# Patient Record
Sex: MALE | Race: WHITE | NOT HISPANIC OR LATINO | Employment: FULL TIME | URBAN - METROPOLITAN AREA
[De-identification: names, ages, dates, MRNs, and addresses within clinical notes are randomized per-mention and may not be internally consistent; named-entity substitution may affect disease eponyms.]

---

## 2019-09-24 ENCOUNTER — OFFICE VISIT (OUTPATIENT)
Dept: ORTHOPEDIC SURGERY | Facility: CLINIC | Age: 39
End: 2019-09-24

## 2019-09-24 VITALS — BODY MASS INDEX: 29.35 KG/M2 | OXYGEN SATURATION: 98 % | WEIGHT: 205 LBS | HEIGHT: 70 IN | HEART RATE: 68 BPM

## 2019-09-24 DIAGNOSIS — M77.02 MEDIAL EPICONDYLITIS OF ELBOW, LEFT: Primary | ICD-10-CM

## 2019-09-24 DIAGNOSIS — M75.22 BICEPS TENDINITIS OF LEFT UPPER EXTREMITY: ICD-10-CM

## 2019-09-24 PROCEDURE — 99204 OFFICE O/P NEW MOD 45 MIN: CPT | Performed by: PHYSICIAN ASSISTANT

## 2019-09-24 RX ORDER — ESCITALOPRAM OXALATE 20 MG/1
TABLET ORAL
COMMUNITY
Start: 2019-08-31 | End: 2022-12-05

## 2019-09-24 RX ORDER — DICLOFENAC SODIUM 75 MG/1
TABLET, DELAYED RELEASE ORAL
COMMUNITY
Start: 2019-09-18 | End: 2022-12-05

## 2019-09-24 RX ORDER — DOXEPIN HYDROCHLORIDE 10 MG/1
CAPSULE ORAL
COMMUNITY
Start: 2019-08-01 | End: 2022-04-28

## 2019-09-24 NOTE — PROGRESS NOTES
[facility] [place] Orthopaedic Surgery Clinic Note    Subjective     Patient: Josué Yang  : 1980    Primary Care Provider: Akhil Caruso MD    Requesting Provider: As above    Pain of the Left Elbow (Last injection 8 months ago )      History    Chief Complaint: left elbow pain    History of Present Illness: This is a very pleasant LHD male with complaints of left medial elbow pain for one year.  He denies any trauma or injury.  He has pain with lifting and driving.  He has rest pain and night pain as well.  He rates ain 7/10 and burning and shooting.  He denies any numbness/tingling in the hand.  He has treated with diclofenac and had steroid injection 8 months ago that improved pain for several weeks.   He is here for further evaluation and treatment recommendations.    Current Outpatient Medications on File Prior to Visit   Medication Sig Dispense Refill   • diclofenac (VOLTAREN) 75 MG EC tablet      • doxepin (SINEquan) 10 MG capsule      • escitalopram (LEXAPRO) 20 MG tablet        No current facility-administered medications on file prior to visit.       No Known Allergies   History reviewed. No pertinent past medical history.  Past Surgical History:   Procedure Laterality Date   • HAND SURGERY Right     Fractured ring finger    • HERNIA REPAIR       History reviewed. No pertinent family history.   Social History     Socioeconomic History   • Marital status:      Spouse name: Not on file   • Number of children: Not on file   • Years of education: Not on file   • Highest education level: Not on file   Tobacco Use   • Smoking status: Never Smoker   • Smokeless tobacco: Never Used   Substance and Sexual Activity   • Alcohol use: Yes     Comment: Occ    • Drug use: No        Review of Systems   Constitutional: Negative.    HENT: Negative.    Eyes: Negative.    Respiratory: Negative.    Cardiovascular: Negative.    Gastrointestinal: Negative.    Endocrine: Negative.    Genitourinary: Negative.  "   Musculoskeletal: Positive for arthralgias.   Skin: Negative.    Allergic/Immunologic: Negative.    Neurological: Negative.    Hematological: Negative.    Psychiatric/Behavioral: Negative.        The following portions of the patient's history were reviewed and updated as appropriate: allergies, current medications, past family history, past medical history, past social history, past surgical history and problem list.      Objective      Physical Exam  Pulse 68   Ht 177.8 cm (70\")   Wt 93 kg (205 lb)   SpO2 98%   BMI 29.41 kg/m²     Body mass index is 29.41 kg/m².    GENERAL: Body habitus: normal weight for height    Gait: normal     Mental Status:  awake and alert; oriented to person, place, and time    Voice:  clear  SKIN:  Normal    Hair Growth:  Right:normal; Left:  normal  HEENT: Head: Normocephalic, atraumatic,  without obvious abnormality.  eye: normal external eye, no icterus   PULM:  Repiratory effort normal    Ortho Exam  Musculoskeletal   Upper Extremity   Peripheral Vascular   Left Upper Extremity    No cyanotic nail beds    Pink nail beds and rapid capillary refill   Palpation    Radial Pulse - Bilaterally normal   Left Shoulder     Inspection and Palpation:     Tenderness - tender over bicipital groove    Crepitus - none    Sensation is normal    Examination reveals no ecchymosis.      Strength and Tone:    Supraspinatus - 5/5    External Rotators-5/5    Infraspinatus - 5/5    Subscapularis - 5/5    Deltoid - 5/5     Range of Motion   Right shoulder:    Internal Rotation: ROM - T7    External Rotation: AROM - 80 degrees    Elevation through flexion: AROM - 180 degrees    Left Shoulder:    Internal Rotation: ROM - T7    External Rotation: AROM - 80 degrees    Elevation through flexion: AROM - 180 degrees        .Peripheral Vascular   Left Upper Extremity    No cyanotic nail beds    Pink nail beds and rapid capillary refill   Palpation    Radial Pulse - Bilaterally normal    Neurologic   Sensory - " Elbow   Inspection and Palpation:    Light touch: intact - right hand   Muscle Strength and Tone:    Left bicep - 5/5    Left tricep - 5/5    Left wrist extensors - 5/5     Left wrist flexors - 5/5  Pain with resisted wrist flexion    Left intrinsics - 5/5    Musculoskeletal   Left Upper Extremity - Elbow   Inspection and Palpation     Tenderness - over the medial epicondyle    Effusion - none    Crepitus - none   Range of Motion    Flexion: AROM - 140 degrees    Extension - AROM - 0 degrees     Forearm supination: AROM - 90 degrees    Forearm pronation - AROM - 90 degrees   Deformities/Malalignments/Discepancies - none   Functional testing:    Valgus stress test negative    Varus stress test negative    Elbow flexion test negative    Tinel's sign at Cubital tunnel negative        Medical Decision Making    Data Review:   ordered and reviewed x-rays today and reviewed radiology images    Assessment:  1. Medial epicondylitis of elbow, left    2. Biceps tendinitis of left upper extremity        Plan:  Left medial epicondylitis and biceps tendonitis.  I reviewed x-rays from 6/19 and clinical findings with the patient.  On exam, he is tender over the medial epicondyle with pain with resisted wrist flexion and at the biceps tendon both proximally and distally.  X-rays are negative for any acute or bony findings.  I think his pain and functional limitations are secondary to medial epicondylitis.  He had injection 8 months ago with relief of elbow pain for several weeks.  Recommendation today is a round of PT for both the elbow and the biceps.  I have given him a prescription for Pennsaid.  He will return in 6 weeks following PT to see how he has progressed or sooner if needed.  He will continue his diclofenac.    History, diagnosis and treatment plan discussed with Dr. Martinez.          Arabella Barriga PA-C  09/25/19  11:31 AM

## 2019-09-25 ENCOUNTER — TELEPHONE (OUTPATIENT)
Dept: ORTHOPEDIC SURGERY | Facility: CLINIC | Age: 39
End: 2019-09-25

## 2019-09-25 NOTE — TELEPHONE ENCOUNTER
PATIENT WAS SEEN 9/24/2019, PRESCRIPTION CREAM WAS SENT TO HIS PHARMACY, SAID HIS INSURANCE DIDN'T COVER IT AND IT WAS EXTREMELY EXPENSIVE, WANTED TO KNOW IF THERE WAS ANYTHING WE COULD SEND OVER THAT WOULD BE CHEAPER.

## 2019-09-26 NOTE — TELEPHONE ENCOUNTER
I called the patient, he explained that [employer] called him and told him this was not covered by his insurance and would cost him $2600. I told him I didn't think that was correct since he has commercial insurance. I called our Rep for Pennsaid (Ana) she said no it should be covered. She told me to cancel the other order and to reorder it and she would take care of it. I called the patient to let him know. Arabella he wanted to know if an MRI would be warranted. He states his insurance covers MRI's 100% and didn't know if that would be an option. Please advise thank you!  Grisel

## 2019-09-26 NOTE — TELEPHONE ENCOUNTER
I think treating with PT and nsaids before MRI is reasonable.  If he would like to have the MRI, I am happy to put in an order.  The MRI would evaluate all soft tissue, muscles, bone etc. To help clarify what is going on.

## 2019-09-27 NOTE — TELEPHONE ENCOUNTER
I called patient and explained this and he is ok with trying PT and NSAIDS first. He did get the Pennsaid for $10.  Grisel

## 2019-11-05 ENCOUNTER — OFFICE VISIT (OUTPATIENT)
Dept: ORTHOPEDIC SURGERY | Facility: CLINIC | Age: 39
End: 2019-11-05

## 2019-11-05 VITALS — WEIGHT: 213.63 LBS | HEART RATE: 90 BPM | HEIGHT: 70 IN | BODY MASS INDEX: 30.58 KG/M2 | OXYGEN SATURATION: 98 %

## 2019-11-05 DIAGNOSIS — M77.02 MEDIAL EPICONDYLITIS OF ELBOW, LEFT: Primary | ICD-10-CM

## 2019-11-05 PROCEDURE — 99212 OFFICE O/P EST SF 10 MIN: CPT | Performed by: PHYSICIAN ASSISTANT

## 2019-11-05 RX ORDER — CEPHALEXIN 500 MG/1
CAPSULE ORAL
COMMUNITY
Start: 2019-10-30 | End: 2020-01-02

## 2019-11-05 NOTE — PROGRESS NOTES
[facility] [place] [place] Orthopaedic Surgery Clinic Note    Subjective     Patient: Josué Yang  : 1980    Primary Care Provider: Akhil Caruso MD    Requesting Provider: As above    Follow-up (6 week f/u; Medial epicondylitis of elbow, left )      History    Chief Complaint: My left elbow    History of Present Illness: Patient returns today for follow-up of his left medial elbow pain.  He has done physical therapy and has been using a hinged elbow brace that he had at home without any improve pain.  He denies any new symptoms.  He denies any radiating pain. His pain is still all medial based pain.  That he rates to be 4/10.  He is done 6 weeks of PT twice a week.  Current Outpatient Medications on File Prior to Visit   Medication Sig Dispense Refill   • cephalexin (KEFLEX) 500 MG capsule      • diclofenac (VOLTAREN) 75 MG EC tablet      • Diclofenac Sodium (PENNSAID) 2 % solution Place 1 g on the skin as directed by provider 2 (Two) Times a Day. 112 g 0   • doxepin (SINEquan) 10 MG capsule      • escitalopram (LEXAPRO) 20 MG tablet        No current facility-administered medications on file prior to visit.       No Known Allergies   History reviewed. No pertinent past medical history.  Past Surgical History:   Procedure Laterality Date   • HAND SURGERY Right     Fractured ring finger    • HERNIA REPAIR       History reviewed. No pertinent family history.   Social History     Socioeconomic History   • Marital status:      Spouse name: Not on file   • Number of children: Not on file   • Years of education: Not on file   • Highest education level: Not on file   Tobacco Use   • Smoking status: Never Smoker   • Smokeless tobacco: Never Used   Substance and Sexual Activity   • Alcohol use: Yes     Comment: Occ    • Drug use: No        Review of Systems   Constitutional: Negative.    HENT: Negative.    Eyes: Negative.    Respiratory: Negative.    Cardiovascular: Negative.    Gastrointestinal: Negative.   "  Endocrine: Negative.    Genitourinary: Negative.    Musculoskeletal: Positive for arthralgias.   Skin: Negative.    Allergic/Immunologic: Negative.    Neurological: Negative.    Hematological: Negative.    Psychiatric/Behavioral: Negative.        The following portions of the patient's history were reviewed and updated as appropriate: allergies, current medications, past family history, past medical history, past social history, past surgical history and problem list.      Objective      Physical Exam  Pulse 90   Ht 177.8 cm (70\")   Wt 96.9 kg (213 lb 10 oz)   SpO2 98%   BMI 30.65 kg/m²      Body mass index is 30.65 kg/m².    Patient is well developed, well nourished and in no acute distress.  Alert and oriented x 3.    Ortho Exam  Peripheral Vascular   Left Upper Extremity    No cyanotic nail beds    Pink nail beds and rapid capillary refill   Palpation    Radial Pulse - Bilaterally normal    Neurologic   Sensory - Elbow   Inspection and Palpation:    Light touch: intact - right hand   Muscle Strength and Tone:    Left bicep - 5/5    Left tricep - 5/5    Left wrist extensors - 5/5     Left wrist flexors - 5/5    Left intrinsics - 5/5    Musculoskeletal   Left Upper Extremity - Elbow   Inspection and Palpation     Tenderness - tender over the medial epicondyle with pain with pain with resisted forearm flexion    Effusion - none    Crepitus - none   Range of Motion    Flexion: AROM - 140 degrees    Extension - AROM - 0 degrees     Forearm supination: AROM - 90 degrees    Forearm pronation - AROM - 90 degrees       Medical Decision Making    Data Review:   none    Assessment:  1. Medial epicondylitis of elbow, left        Plan:  Left medial epicondylitis.  On exam, he is  over the medial epicondyle with pain with resisted flexion of the wrist.  Patient emili also had prior injection at another practice and has had 6 weeks of PT with home exercises as well.  He has used topical anti-inflammatories.  He " is used bracing.  He reports no improvement of his pain with persisting pain.  He is stayed off of work as well in hopes of improving pain.  Recommendation today is MRI of the left elbow for further evaluation.  In the meantime, he will back off of physical therapy but continue with the brace and anti-inflammatories.      Arabella Barriga PA-C  11/05/19  1:12 PM

## 2019-11-11 ENCOUNTER — HOSPITAL ENCOUNTER (OUTPATIENT)
Dept: MRI IMAGING | Facility: HOSPITAL | Age: 39
Discharge: HOME | End: 2019-11-11
Admitting: PHYSICIAN ASSISTANT

## 2019-11-11 DIAGNOSIS — M77.02 MEDIAL EPICONDYLITIS OF ELBOW, LEFT: ICD-10-CM

## 2019-11-11 PROCEDURE — 73221 MRI JOINT UPR EXTREM W/O DYE: CPT

## 2019-11-19 ENCOUNTER — OFFICE VISIT (OUTPATIENT)
Dept: ORTHOPEDIC SURGERY | Facility: CLINIC | Age: 39
End: 2019-11-19

## 2019-11-19 VITALS — OXYGEN SATURATION: 98 % | HEIGHT: 70 IN | WEIGHT: 213.63 LBS | HEART RATE: 101 BPM | BODY MASS INDEX: 30.58 KG/M2

## 2019-11-19 DIAGNOSIS — M25.522 LEFT ELBOW PAIN: Primary | ICD-10-CM

## 2019-11-19 DIAGNOSIS — M77.02 MEDIAL EPICONDYLITIS OF ELBOW, LEFT: ICD-10-CM

## 2019-11-19 PROCEDURE — 20551 NJX 1 TENDON ORIGIN/INSJ: CPT | Performed by: PHYSICIAN ASSISTANT

## 2019-11-19 PROCEDURE — 99213 OFFICE O/P EST LOW 20 MIN: CPT | Performed by: PHYSICIAN ASSISTANT

## 2019-11-19 RX ADMIN — METHYLPREDNISOLONE ACETATE 20 MG: 40 INJECTION, SUSPENSION INTRA-ARTICULAR; INTRALESIONAL; INTRAMUSCULAR; SOFT TISSUE at 11:35

## 2019-11-19 NOTE — PROGRESS NOTES
Procedure   Medium Joint Arthrocentesis: L elbow  Date/Time: 11/19/2019 11:35 AM  Consent given by: patient  Site marked: site marked  Timeout: Immediately prior to procedure a time out was called to verify the correct patient, procedure, equipment, support staff and site/side marked as required   Supporting Documentation  Indications: pain   Procedure Details  Location: elbow - L elbow (left epicondyle injection)  Preparation: Patient was prepped and draped in the usual sterile fashion  Needle size: 22 G  Approach: anteromedial  Medications administered: 1 mL lidocaine (cardiac); 20 mg methylPREDNISolone acetate 40 MG/ML

## 2019-11-19 NOTE — PROGRESS NOTES
[facility] [place] [facility] [place] [facility] Note    Subjective     Patient: Josué Yang  : 1980    Primary Care Provider: Akhil Caruso MD    Requesting Provider: As above    Follow-up and Pain of the Left Elbow (Medial Epicondylitis of Elbow/Post MRI )      History    Chief Complaint: Follow-up left elbow MRI    History of Present Illness: Patient returns today for follow-up of his left MRI MRI.  He reports that the pain has persisted and not improved.  It is the same pain medial in nature.  He has aching and burning.  He has been off work.  He is using a brace as well as done physical therapy, anti-inflammatories with no improvement of his pain he did have injection about 8 months ago which improved his pain for several weeks.    Current Outpatient Medications on File Prior to Visit   Medication Sig Dispense Refill   • cephalexin (KEFLEX) 500 MG capsule      • diclofenac (VOLTAREN) 75 MG EC tablet      • Diclofenac Sodium (PENNSAID) 2 % solution Place 1 g on the skin as directed by provider 2 (Two) Times a Day. 112 g 0   • doxepin (SINEquan) 10 MG capsule      • escitalopram (LEXAPRO) 20 MG tablet        No current facility-administered medications on file prior to visit.       No Known Allergies   History reviewed. No pertinent past medical history.  Past Surgical History:   Procedure Laterality Date   • HAND SURGERY Right     Fractured ring finger    • HERNIA REPAIR       History reviewed. No pertinent family history.   Social History     Socioeconomic History   • Marital status:      Spouse name: Not on file   • Number of children: Not on file   • Years of education: Not on file   • Highest education level: Not on file   Tobacco Use   • Smoking status: Never Smoker   • Smokeless tobacco: Never Used   Substance and Sexual Activity   • Alcohol use: Yes     Comment: Occ    • Drug use: No        Review of Systems   Constitutional: Negative.    HENT: Negative.    Eyes: Negative.   "  Respiratory: Negative.    Cardiovascular: Negative.    Gastrointestinal: Negative.    Endocrine: Negative.    Genitourinary: Negative.    Musculoskeletal: Positive for arthralgias.   Skin: Negative.    Allergic/Immunologic: Negative.    Neurological: Negative.    Hematological: Negative.    Psychiatric/Behavioral: Negative.        The following portions of the patient's history were reviewed and updated as appropriate: allergies, current medications, past family history, past medical history, past social history, past surgical history and problem list.      Objective      Physical Exam  Pulse 101   Ht 177.8 cm (70\")   Wt 96.9 kg (213 lb 10 oz)   SpO2 98%   BMI 30.65 kg/m²     Body mass index is 30.65 kg/m².    Patient is well developed, well nourished and in no acute distress.  Alert and oriented x 3.    Ortho Exam  Left elbow exam:  Patient is tender over the medial epicondyle with pain with resisted wrist flexion and forearm pronation.  He has normal motion and strength of the elbow.  Medical Decision Making    Data Review:   reviewed radiology images    Assessment:  1. Left elbow pain    2. Medial epicondylitis of elbow, left        Plan:  Left medial epicondylitis.  I reviewed MRI from 11/15/2019 and clinical findings with the patient.  MRI shows mild changes of medial epicondylitis with questionable mild lateral epicondylitis as well.  No evidence of internal derangement, acute or healing trauma elsewhere.  Patient has exhausted conservative treatment including physical therapy, anti-inflammatories, activity modification, bracing.  I reassured the patient that there is nothing worrisome on exam.  He had an injection about 8 months ago with gave him several weeks relief.  Plan today is repeat steroid injection in to the medial epicondyle at the origin.  He will return to see me in 6 to 8 weeks or sooner if needed.  Unfortunately, I do not know if there is anything else we have to offer him.    History, " diagnosis and treatment plan discussed with Dr. Martinez.    See procedure note for details.      Arabella Barriga PA-C  11/20/19  2:25 PM

## 2019-11-20 RX ORDER — METHYLPREDNISOLONE ACETATE 40 MG/ML
20 INJECTION, SUSPENSION INTRA-ARTICULAR; INTRALESIONAL; INTRAMUSCULAR; SOFT TISSUE
Status: COMPLETED | OUTPATIENT
Start: 2019-11-19 | End: 2019-11-19

## 2019-11-26 ENCOUNTER — TELEPHONE (OUTPATIENT)
Dept: ORTHOPEDIC SURGERY | Facility: CLINIC | Age: 39
End: 2019-11-26

## 2020-01-02 ENCOUNTER — OFFICE VISIT (OUTPATIENT)
Dept: ORTHOPEDIC SURGERY | Facility: CLINIC | Age: 40
End: 2020-01-02

## 2020-01-02 VITALS — OXYGEN SATURATION: 98 % | WEIGHT: 214 LBS | HEART RATE: 76 BPM | HEIGHT: 70 IN | BODY MASS INDEX: 30.64 KG/M2

## 2020-01-02 DIAGNOSIS — M77.02 MEDIAL EPICONDYLITIS OF ELBOW, LEFT: Primary | ICD-10-CM

## 2020-01-02 PROCEDURE — 99212 OFFICE O/P EST SF 10 MIN: CPT | Performed by: PHYSICIAN ASSISTANT

## 2020-01-02 NOTE — PROGRESS NOTES
[facility] [place] Orthopaedic Surgery Clinic Note    Subjective     Patient: Josué Yang  : 1980    Primary Care Provider: Akhil Caruso MD    Requesting Provider: As above    Follow-up (6 week recheck - Medial epicondylitis of elbow, left )      History    Chief Complaint: Follow-up medial epicondylitis    History of Present Illness: Patient returns today for follow-up of his left medial epicondylitis.  He reports no change in pain.  He did have steroid injection 6 weeks ago.  This was his second injection.  He has done physical therapy, bracing, activity modification with persisting pain.  He has had MRI as well.  He is here for further evaluation and treatment recommendations.  He has returned to work at [employer].    Current Outpatient Medications on File Prior to Visit   Medication Sig Dispense Refill   • Diclofenac Sodium (PENNSAID) 2 % solution Place 1 g on the skin as directed by provider 2 (Two) Times a Day. 112 g 0   • doxepin (SINEquan) 10 MG capsule      • escitalopram (LEXAPRO) 20 MG tablet      • diclofenac (VOLTAREN) 75 MG EC tablet        No current facility-administered medications on file prior to visit.       No Known Allergies   History reviewed. No pertinent past medical history.  Past Surgical History:   Procedure Laterality Date   • HAND SURGERY Right     Fractured ring finger    • HERNIA REPAIR       History reviewed. No pertinent family history.   Social History     Socioeconomic History   • Marital status:      Spouse name: Not on file   • Number of children: Not on file   • Years of education: Not on file   • Highest education level: Not on file   Tobacco Use   • Smoking status: Never Smoker   • Smokeless tobacco: Never Used   Substance and Sexual Activity   • Alcohol use: Yes     Comment: Occ    • Drug use: No        Review of Systems   Constitutional: Negative.    HENT: Negative.    Eyes: Negative.    Respiratory: Negative.    Cardiovascular: Negative.   "  Gastrointestinal: Negative.    Endocrine: Negative.    Genitourinary: Negative.    Musculoskeletal: Positive for arthralgias.   Skin: Negative.    Allergic/Immunologic: Negative.    Neurological: Negative.    Hematological: Negative.    Psychiatric/Behavioral: Negative.        The following portions of the patient's history were reviewed and updated as appropriate: allergies, current medications, past family history, past medical history, past social history, past surgical history and problem list.      Objective      Physical Exam  Pulse 76   Ht 177.8 cm (70\")   Wt 97.1 kg (214 lb)   SpO2 98%   BMI 30.71 kg/m²     Body mass index is 30.71 kg/m².    Patient is well developed, well nourished and in no acute distress.  Alert and oriented x 3.    Ortho Exam  Peripheral Vascular   Left Upper Extremity    No cyanotic nail beds    Pink nail beds and rapid capillary refill   Palpation    Radial Pulse - Bilaterally normal    Neurologic   Sensory - Elbow   Inspection and Palpation:    Light touch: intact - right hand   Muscle Strength and Tone:    Left bicep - 5/5    Left tricep - 5/5    Left wrist extensors - 5/5     Left wrist flexors - 5/5    Left intrinsics - 5/5    Musculoskeletal   Left Upper Extremity - Elbow   Inspection and Palpation     Tenderness - at the medial epicondyle    Effusion - none    Crepitus - none   Range of Motion    Flexion: AROM - 140 degrees    Extension - AROM - 0 degrees     Forearm supination: AROM - 90 degrees    Forearm pronation - AROM - 90 degrees       Medical Decision Making    Data Review:   none    Assessment:  1. Medial epicondylitis of elbow, left        Plan:  Left medial epicondylitis persisting despite conservative treatment with physical therapy, topical anti-inflammatories, oral anti-inflammatories, bracing, steroid injection.  MRI did show medial epicondylitis with nothing more worrisome.  We discussed further treatment options including PRP injection versus referral to an " elbow specialist.  I explained that the PRP injection is out-of-pocket.  His been shown to improve epicondylitis more than it has with other diagnoses.  Patient would like to try referral to elbow surgeon to see if they have any further treatment options.  I will put in referral to Dr. Song.  He will return to see us as needed.    History, diagnosis and treatment plan discussed with Dr. Martinez.          Arabella Barriga PA-C  01/03/20  9:11 AM

## 2022-04-28 RX ORDER — DOXEPIN HYDROCHLORIDE 10 MG/1
CAPSULE ORAL
Qty: 60 CAPSULE | Refills: 0 | Status: SHIPPED | OUTPATIENT
Start: 2022-04-28 | End: 2022-06-03

## 2022-06-03 RX ORDER — DOXEPIN HYDROCHLORIDE 10 MG/1
CAPSULE ORAL
Qty: 10 CAPSULE | Refills: 0 | Status: SHIPPED | OUTPATIENT
Start: 2022-06-03 | End: 2022-08-01 | Stop reason: SDUPTHER

## 2022-06-03 NOTE — TELEPHONE ENCOUNTER
Gave enough for 5 days so patient can call the office and get an appointment. He was last seen in the office on 01/24/22 for covid. Other than that visit, patient has not been seen for medications since 10/2020. Tried calling patient, but it would not ring out. Unsure what is going on

## 2022-07-29 RX ORDER — DOXEPIN HYDROCHLORIDE 10 MG/1
CAPSULE ORAL
Qty: 10 CAPSULE | Refills: 0 | OUTPATIENT
Start: 2022-07-29

## 2022-08-01 RX ORDER — DOXEPIN HYDROCHLORIDE 10 MG/1
10-20 CAPSULE ORAL
Qty: 60 CAPSULE | Refills: 0 | Status: SHIPPED | OUTPATIENT
Start: 2022-08-01 | End: 2022-09-15

## 2022-09-15 RX ORDER — DOXEPIN HYDROCHLORIDE 10 MG/1
CAPSULE ORAL
Qty: 60 CAPSULE | Refills: 0 | Status: SHIPPED | OUTPATIENT
Start: 2022-09-15 | End: 2022-10-17

## 2022-09-15 NOTE — TELEPHONE ENCOUNTER
Patient has been told to get an appointment. He had one scheduled for tomorrow and cancelled and rescheduled now for end of December. He has not been seen with us since 2020.

## 2022-10-17 RX ORDER — DOXEPIN HYDROCHLORIDE 10 MG/1
CAPSULE ORAL
Qty: 60 CAPSULE | Refills: 0 | Status: SHIPPED | OUTPATIENT
Start: 2022-10-17 | End: 2022-11-16

## 2022-11-16 ENCOUNTER — TELEPHONE (OUTPATIENT)
Dept: FAMILY MEDICINE CLINIC | Facility: CLINIC | Age: 42
End: 2022-11-16

## 2022-11-16 RX ORDER — DOXEPIN HYDROCHLORIDE 10 MG/1
CAPSULE ORAL
Qty: 60 CAPSULE | Refills: 0 | Status: SHIPPED | OUTPATIENT
Start: 2022-11-16 | End: 2022-12-05 | Stop reason: SDUPTHER

## 2022-11-16 NOTE — TELEPHONE ENCOUNTER
Caller: Josué Yang    Relationship: Self    Best call back number:[phone]    What orders are you requesting (i.e. lab or imaging): LABS    In what timeframe would the patient need to come in: ANY Monday BEFORE THAT OR ANY DAY THIS WEEK    Where will you receive your lab/imaging services: IN OFFICE    Additional notes: PLEASE CALL TO SCHEDULE. THIS IS FOR HIS APPOINTMENT ON 12/5

## 2022-11-17 ENCOUNTER — LAB (OUTPATIENT)
Dept: FAMILY MEDICINE CLINIC | Facility: CLINIC | Age: 42
End: 2022-11-17

## 2022-11-17 DIAGNOSIS — Z00.00 ROUTINE GENERAL MEDICAL EXAMINATION AT A HEALTH CARE FACILITY: Primary | ICD-10-CM

## 2022-11-17 PROCEDURE — 36415 COLL VENOUS BLD VENIPUNCTURE: CPT | Performed by: FAMILY MEDICINE

## 2022-11-18 LAB
ALBUMIN SERPL-MCNC: 4.9 G/DL (ref 4–5)
ALBUMIN/GLOB SERPL: 1.7 {RATIO} (ref 1.2–2.2)
ALP SERPL-CCNC: 71 IU/L (ref 44–121)
ALT SERPL-CCNC: 26 IU/L (ref 0–44)
AST SERPL-CCNC: 22 IU/L (ref 0–40)
BASOPHILS # BLD AUTO: 0.1 X10E3/UL (ref 0–0.2)
BASOPHILS NFR BLD AUTO: 1 %
BILIRUB SERPL-MCNC: 0.2 MG/DL (ref 0–1.2)
BUN SERPL-MCNC: 15 MG/DL (ref 6–24)
BUN/CREAT SERPL: 22 (ref 9–20)
CALCIUM SERPL-MCNC: 9.7 MG/DL (ref 8.7–10.2)
CHLORIDE SERPL-SCNC: 99 MMOL/L (ref 96–106)
CHOLEST SERPL-MCNC: 255 MG/DL (ref 100–199)
CO2 SERPL-SCNC: 23 MMOL/L (ref 20–29)
CREAT SERPL-MCNC: 0.67 MG/DL (ref 0.76–1.27)
EGFRCR SERPLBLD CKD-EPI 2021: 119 ML/MIN/1.73
EOSINOPHIL # BLD AUTO: 0.2 X10E3/UL (ref 0–0.4)
EOSINOPHIL NFR BLD AUTO: 3 %
ERYTHROCYTE [DISTWIDTH] IN BLOOD BY AUTOMATED COUNT: 13 % (ref 11.6–15.4)
GLOBULIN SER CALC-MCNC: 2.9 G/DL (ref 1.5–4.5)
GLUCOSE SERPL-MCNC: 86 MG/DL (ref 70–99)
HCT VFR BLD AUTO: 48 % (ref 37.5–51)
HDLC SERPL-MCNC: 49 MG/DL
HGB BLD-MCNC: 16.5 G/DL (ref 13–17.7)
IMM GRANULOCYTES # BLD AUTO: 0 X10E3/UL (ref 0–0.1)
IMM GRANULOCYTES NFR BLD AUTO: 0 %
LDLC SERPL CALC-MCNC: 101 MG/DL (ref 0–99)
LYMPHOCYTES # BLD AUTO: 2.7 X10E3/UL (ref 0.7–3.1)
LYMPHOCYTES NFR BLD AUTO: 34 %
MCH RBC QN AUTO: 29.9 PG (ref 26.6–33)
MCHC RBC AUTO-ENTMCNC: 34.4 G/DL (ref 31.5–35.7)
MCV RBC AUTO: 87 FL (ref 79–97)
MONOCYTES # BLD AUTO: 0.5 X10E3/UL (ref 0.1–0.9)
MONOCYTES NFR BLD AUTO: 7 %
NEUTROPHILS # BLD AUTO: 4.4 X10E3/UL (ref 1.4–7)
NEUTROPHILS NFR BLD AUTO: 55 %
PLATELET # BLD AUTO: 254 X10E3/UL (ref 150–450)
POTASSIUM SERPL-SCNC: 4.7 MMOL/L (ref 3.5–5.2)
PROT SERPL-MCNC: 7.8 G/DL (ref 6–8.5)
RBC # BLD AUTO: 5.52 X10E6/UL (ref 4.14–5.8)
SODIUM SERPL-SCNC: 139 MMOL/L (ref 134–144)
TRIGL SERPL-MCNC: 621 MG/DL (ref 0–149)
TSH SERPL DL<=0.005 MIU/L-ACNC: 2.03 UIU/ML (ref 0.45–4.5)
VLDLC SERPL CALC-MCNC: 105 MG/DL (ref 5–40)
WBC # BLD AUTO: 8 X10E3/UL (ref 3.4–10.8)

## 2022-12-05 ENCOUNTER — OFFICE VISIT (OUTPATIENT)
Dept: FAMILY MEDICINE CLINIC | Facility: CLINIC | Age: 42
End: 2022-12-05

## 2022-12-05 VITALS
SYSTOLIC BLOOD PRESSURE: 140 MMHG | BODY MASS INDEX: 28.77 KG/M2 | WEIGHT: 201 LBS | DIASTOLIC BLOOD PRESSURE: 84 MMHG | OXYGEN SATURATION: 98 % | HEART RATE: 64 BPM | HEIGHT: 70 IN

## 2022-12-05 DIAGNOSIS — E78.5 HYPERLIPIDEMIA, UNSPECIFIED HYPERLIPIDEMIA TYPE: ICD-10-CM

## 2022-12-05 DIAGNOSIS — F51.01 PRIMARY INSOMNIA: ICD-10-CM

## 2022-12-05 DIAGNOSIS — Z00.00 ROUTINE GENERAL MEDICAL EXAMINATION AT A HEALTH CARE FACILITY: Primary | ICD-10-CM

## 2022-12-05 PROCEDURE — 99396 PREV VISIT EST AGE 40-64: CPT | Performed by: FAMILY MEDICINE

## 2022-12-05 PROCEDURE — 36415 COLL VENOUS BLD VENIPUNCTURE: CPT | Performed by: FAMILY MEDICINE

## 2022-12-05 RX ORDER — DOXEPIN HYDROCHLORIDE 10 MG/1
CAPSULE ORAL
Qty: 180 CAPSULE | Refills: 1 | Status: SHIPPED | OUTPATIENT
Start: 2022-12-05

## 2022-12-05 NOTE — PROGRESS NOTES
Male Physical Note      Date:  2022   Patient Name: Josué Yang  : 1980   MRN: [id number]     Chief Complaint:    Chief Complaint   Patient presents with   • Annual Exam       History of Present Illness: Josué Yang is a 42 y.o. male who is here today for their annual health maintenance and physical.  Overall patient doing well.  Does take doxepin for insomnia.  Blood work was normal all but his lipid panel.  He was not fasting.      Subjective      Review of Systems:   Review of Systems   Constitutional: Negative for fatigue and fever.   HENT: Negative for congestion and ear pain.    Respiratory: Negative for apnea, cough, chest tightness and shortness of breath.    Cardiovascular: Negative for chest pain.   Gastrointestinal: Negative for abdominal pain, constipation, diarrhea and nausea.   Musculoskeletal: Negative for arthralgias.   Psychiatric/Behavioral: Negative for depressed mood and stress.       Past Medical History:   Past Medical History:   Diagnosis Date   • Enthesopathy    • Insomnia        Past Surgical History:   Past Surgical History:   Procedure Laterality Date   • HAND SURGERY Right     Fractured ring finger    • HERNIA REPAIR         Family History:   Family History   Problem Relation Age of Onset   • Hyperlipidemia Father    • Asthma Father    • Hypertension Father        Social History:   Social History     Socioeconomic History   • Marital status:    Tobacco Use   • Smoking status: Never   • Smokeless tobacco: Never   Substance and Sexual Activity   • Alcohol use: Yes     Comment: Occ    • Drug use: No       Medications:     Current Outpatient Medications:   •  doxepin (SINEquan) 10 MG capsule, TAKE ONE TO TWO CAPSULES BY MOUTH EVERY NIGHT AT BEDTIME, Disp: 180 capsule, Rfl: 1    Allergies:   No Known Allergies    Immunization History   Administered Date(s) Administered   • COVID-19 (PFIZER) PURPLE CAP 2021, 2021   • Hepatitis A 2019,  "09/18/2019   • Influenza, Unspecified 10/13/2020   • Tdap 10/13/2020     Colorectal Screening:     Last Completed Colonoscopy     This patient has no relevant Health Maintenance data.           Diet/Physical activity: Appropriate diet and physical activity discussed.    Depression: PHQ-2 Depression Screening  Little interest or pleasure in doing things? 0-->not at all   Feeling down, depressed, or hopeless? 0-->not at all   PHQ-2 Total Score 0        Objective     Physical Exam:  Vital Signs:   Vitals:    12/05/22 0844   BP: 140/84   Pulse: 64   SpO2: 98%   Weight: 91.2 kg (201 lb)   Height: 177.8 cm (70\")     Body mass index is 28.84 kg/m².     Physical Exam  Vitals and nursing note reviewed.   Constitutional:       General: He is not in acute distress.     Appearance: Normal appearance. He is not ill-appearing.   HENT:      Head: Normocephalic and atraumatic.      Right Ear: Tympanic membrane and ear canal normal.      Left Ear: Tympanic membrane and ear canal normal.      Nose: Nose normal.   Cardiovascular:      Rate and Rhythm: Normal rate and regular rhythm.      Heart sounds: Normal heart sounds.   Pulmonary:      Effort: Pulmonary effort is normal.      Breath sounds: Normal breath sounds.   Neurological:      Mental Status: He is alert and oriented to person, place, and time. Mental status is at baseline.   Psychiatric:         Mood and Affect: Mood normal.         Procedures    Assessment / Plan      Assessment/Plan:   Diagnoses and all orders for this visit:    1. Routine general medical examination at a health care facility (Primary)    2. Hyperlipidemia, unspecified hyperlipidemia type  -     Lipid panel; Future    3. Primary insomnia    Other orders  -     doxepin (SINEquan) 10 MG capsule; TAKE ONE TO TWO CAPSULES BY MOUTH EVERY NIGHT AT BEDTIME  Dispense: 180 capsule; Refill: 1         We will recheck lipid panel since his numbers were elevated.  He was not fasting.  Annual health maintenance " discussed.  Refill doxepin for insomnia.      Follow Up:   No follow-ups on file.    Healthcare Maintenance:   Counseling provided on appropriate health maintenance discussed.  Josué Yang voices understanding and acceptance of this advice and will call back with any further questions or concerns. AVS with preventive healthcare tips printed for patient.     Akhil Caruso MD  [facility] [place] [facility] [employer]

## 2022-12-06 LAB
CHOLEST SERPL-MCNC: 270 MG/DL (ref 100–199)
HDLC SERPL-MCNC: 56 MG/DL
LDLC SERPL CALC-MCNC: 186 MG/DL (ref 0–99)
TRIGL SERPL-MCNC: 153 MG/DL (ref 0–149)
VLDLC SERPL CALC-MCNC: 28 MG/DL (ref 5–40)

## 2023-01-30 ENCOUNTER — OFFICE VISIT (OUTPATIENT)
Dept: FAMILY MEDICINE CLINIC | Facility: CLINIC | Age: 43
End: 2023-01-30
Payer: COMMERCIAL

## 2023-01-30 VITALS
DIASTOLIC BLOOD PRESSURE: 88 MMHG | OXYGEN SATURATION: 98 % | SYSTOLIC BLOOD PRESSURE: 120 MMHG | HEIGHT: 70 IN | BODY MASS INDEX: 28.49 KG/M2 | HEART RATE: 52 BPM | WEIGHT: 199 LBS

## 2023-01-30 DIAGNOSIS — E78.2 MIXED HYPERLIPIDEMIA: Primary | ICD-10-CM

## 2023-01-30 PROCEDURE — 99213 OFFICE O/P EST LOW 20 MIN: CPT | Performed by: FAMILY MEDICINE

## 2023-01-30 RX ORDER — ATORVASTATIN CALCIUM 20 MG/1
20 TABLET, FILM COATED ORAL DAILY
Qty: 90 TABLET | Refills: 1 | Status: SHIPPED | OUTPATIENT
Start: 2023-01-30

## 2023-01-30 NOTE — PROGRESS NOTES
Follow Up Office Visit      Date of Visit:  2023   Patient Name: Josué Yang  : 1980   MRN: [id number]     Chief Complaint:    Chief Complaint   Patient presents with   • go over labs       History of Present Illness: Josué Yang is a 42 y.o. male who is here today for follow up.  Patient here to review recent labs.  His recent lipid panel showed a very elevated triglycerides.  He was not fasting.  Recheck showed this to be more appropriate but his LDL was over 180.  Not currently on medication.        Subjective      Review of Systems:   Review of Systems   Constitutional: Negative for fatigue and fever.   HENT: Negative for congestion and ear pain.    Respiratory: Negative for apnea, cough, chest tightness and shortness of breath.    Cardiovascular: Negative for chest pain.   Gastrointestinal: Negative for abdominal pain, constipation, diarrhea and nausea.   Musculoskeletal: Negative for arthralgias.   Psychiatric/Behavioral: Negative for depressed mood and stress.       Past Medical History:   Past Medical History:   Diagnosis Date   • Enthesopathy    • Insomnia        Past Surgical History:   Past Surgical History:   Procedure Laterality Date   • HAND SURGERY Right     Fractured ring finger    • HERNIA REPAIR         Family History:   Family History   Problem Relation Age of Onset   • Hyperlipidemia Father    • Asthma Father    • Hypertension Father        Social History:   Social History     Socioeconomic History   • Marital status:    Tobacco Use   • Smoking status: Never   • Smokeless tobacco: Never   Substance and Sexual Activity   • Alcohol use: Yes     Comment: Occ    • Drug use: No       Medications:     Current Outpatient Medications:   •  doxepin (SINEquan) 10 MG capsule, TAKE ONE TO TWO CAPSULES BY MOUTH EVERY NIGHT AT BEDTIME, Disp: 180 capsule, Rfl: 1  •  atorvastatin (Lipitor) 20 MG tablet, Take 1 tablet by mouth Daily., Disp: 90 tablet, Rfl: 1    Allergies:  "  No Known Allergies    Objective     Physical Exam:  Vital Signs:   Vitals:    01/30/23 0958   BP: 120/88   Pulse: 52   SpO2: 98%   Weight: 90.3 kg (199 lb)   Height: 177.8 cm (70\")     Body mass index is 28.55 kg/m².     Physical Exam  Vitals and nursing note reviewed.   Constitutional:       General: He is not in acute distress.     Appearance: Normal appearance. He is not ill-appearing.   HENT:      Head: Normocephalic and atraumatic.      Right Ear: Tympanic membrane and ear canal normal.      Left Ear: Tympanic membrane and ear canal normal.      Nose: Nose normal.   Cardiovascular:      Rate and Rhythm: Normal rate and regular rhythm.      Heart sounds: Normal heart sounds.   Pulmonary:      Effort: Pulmonary effort is normal.      Breath sounds: Normal breath sounds.   Neurological:      Mental Status: He is alert and oriented to person, place, and time. Mental status is at baseline.   Psychiatric:         Mood and Affect: Mood normal.         Procedures      Assessment / Plan      Assessment/Plan:   Diagnoses and all orders for this visit:    1. Mixed hyperlipidemia (Primary)  -     Comprehensive Metabolic Panel; Future  -     Lipid Panel; Future    Other orders  -     atorvastatin (Lipitor) 20 MG tablet; Take 1 tablet by mouth Daily.  Dispense: 90 tablet; Refill: 1         Placed order for Lipitor.  Recheck labs in 3 months.    Follow Up:   No follow-ups on file.    Akhil Caruso  [facility] [place]   "

## 2023-04-24 ENCOUNTER — LAB (OUTPATIENT)
Dept: FAMILY MEDICINE CLINIC | Facility: CLINIC | Age: 43
End: 2023-04-24
Payer: COMMERCIAL

## 2023-04-24 DIAGNOSIS — E78.2 MIXED HYPERLIPIDEMIA: ICD-10-CM

## 2023-04-24 PROCEDURE — 36415 COLL VENOUS BLD VENIPUNCTURE: CPT | Performed by: FAMILY MEDICINE

## 2023-04-25 LAB
ALBUMIN SERPL-MCNC: 4.6 G/DL (ref 4–5)
ALBUMIN/GLOB SERPL: 2 {RATIO} (ref 1.2–2.2)
ALP SERPL-CCNC: 55 IU/L (ref 44–121)
ALT SERPL-CCNC: 22 IU/L (ref 0–44)
AST SERPL-CCNC: 20 IU/L (ref 0–40)
BILIRUB SERPL-MCNC: 0.9 MG/DL (ref 0–1.2)
BUN SERPL-MCNC: 14 MG/DL (ref 6–24)
BUN/CREAT SERPL: 13 (ref 9–20)
CALCIUM SERPL-MCNC: 9.2 MG/DL (ref 8.7–10.2)
CHLORIDE SERPL-SCNC: 105 MMOL/L (ref 96–106)
CHOLEST SERPL-MCNC: 132 MG/DL (ref 100–199)
CO2 SERPL-SCNC: 25 MMOL/L (ref 20–29)
CREAT SERPL-MCNC: 1.06 MG/DL (ref 0.76–1.27)
EGFRCR SERPLBLD CKD-EPI 2021: 89 ML/MIN/1.73
GLOBULIN SER CALC-MCNC: 2.3 G/DL (ref 1.5–4.5)
GLUCOSE SERPL-MCNC: 95 MG/DL (ref 70–99)
HDLC SERPL-MCNC: 48 MG/DL
LDLC SERPL CALC-MCNC: 70 MG/DL (ref 0–99)
POTASSIUM SERPL-SCNC: 4.6 MMOL/L (ref 3.5–5.2)
PROT SERPL-MCNC: 6.9 G/DL (ref 6–8.5)
SODIUM SERPL-SCNC: 142 MMOL/L (ref 134–144)
TRIGL SERPL-MCNC: 69 MG/DL (ref 0–149)
VLDLC SERPL CALC-MCNC: 14 MG/DL (ref 5–40)

## 2023-09-19 RX ORDER — ATORVASTATIN CALCIUM 20 MG/1
TABLET, FILM COATED ORAL
Qty: 30 TABLET | Refills: 0 | Status: SHIPPED | OUTPATIENT
Start: 2023-09-19

## 2023-09-20 ENCOUNTER — PATIENT MESSAGE (OUTPATIENT)
Dept: FAMILY MEDICINE CLINIC | Facility: CLINIC | Age: 43
End: 2023-09-20
Payer: COMMERCIAL

## 2023-09-20 DIAGNOSIS — Z00.00 ROUTINE GENERAL MEDICAL EXAMINATION AT A HEALTH CARE FACILITY: Primary | ICD-10-CM

## 2023-09-22 NOTE — TELEPHONE ENCOUNTER
Patient is coming in for a physical in December. He is also scheduled for a lab appointment. Please put in lab orders.

## 2023-09-25 RX ORDER — DOXEPIN HYDROCHLORIDE 10 MG/1
CAPSULE ORAL
Qty: 180 CAPSULE | Refills: 1 | Status: SHIPPED | OUTPATIENT
Start: 2023-09-25

## 2023-10-17 RX ORDER — ATORVASTATIN CALCIUM 20 MG/1
20 TABLET, FILM COATED ORAL DAILY
Qty: 30 TABLET | Refills: 2 | Status: SHIPPED | OUTPATIENT
Start: 2023-10-17

## 2023-12-11 ENCOUNTER — LAB (OUTPATIENT)
Dept: FAMILY MEDICINE CLINIC | Facility: CLINIC | Age: 43
End: 2023-12-11
Payer: COMMERCIAL

## 2023-12-18 ENCOUNTER — OFFICE VISIT (OUTPATIENT)
Dept: FAMILY MEDICINE CLINIC | Facility: CLINIC | Age: 43
End: 2023-12-18
Payer: COMMERCIAL

## 2023-12-18 VITALS
WEIGHT: 194 LBS | HEART RATE: 57 BPM | BODY MASS INDEX: 27.77 KG/M2 | HEIGHT: 70 IN | DIASTOLIC BLOOD PRESSURE: 80 MMHG | OXYGEN SATURATION: 98 % | SYSTOLIC BLOOD PRESSURE: 118 MMHG

## 2023-12-18 DIAGNOSIS — E78.2 MIXED HYPERLIPIDEMIA: ICD-10-CM

## 2023-12-18 DIAGNOSIS — F41.9 ANXIETY: ICD-10-CM

## 2023-12-18 DIAGNOSIS — Z00.00 ROUTINE GENERAL MEDICAL EXAMINATION AT A HEALTH CARE FACILITY: Primary | ICD-10-CM

## 2023-12-18 RX ORDER — DOXEPIN HYDROCHLORIDE 10 MG/1
CAPSULE ORAL
Qty: 180 CAPSULE | Refills: 1 | Status: SHIPPED | OUTPATIENT
Start: 2023-12-18

## 2023-12-18 RX ORDER — ATORVASTATIN CALCIUM 20 MG/1
20 TABLET, FILM COATED ORAL DAILY
Qty: 30 TABLET | Refills: 11 | Status: SHIPPED | OUTPATIENT
Start: 2023-12-18

## 2023-12-18 NOTE — PROGRESS NOTES
Male Physical Note      Date:  2023   Patient Name: Josué Yang  : 1980   MRN: [id number]     Chief Complaint:    Chief Complaint   Patient presents with    Annual Exam       History of Present Illness: Josué Yang is a 43 y.o. male who is here today for their annual health maintenance and physical.  Appropriate diet and exercise discussed.  Appropriate health maintenance discussed.  Discussed future colon cancer screening.  Flu shot given today.  Declines COVID-vaccine.  Needs refills on lipid medication.  Due for blood work.  Also taking medications for some anxiety and insomnia.  Condition overall stable.      Subjective      Review of Systems:   Review of Systems   Constitutional:  Negative for fatigue and fever.   HENT:  Negative for congestion and ear pain.    Respiratory:  Negative for apnea, cough, chest tightness and shortness of breath.    Cardiovascular:  Negative for chest pain.   Gastrointestinal:  Negative for abdominal pain, constipation, diarrhea and nausea.   Musculoskeletal:  Negative for arthralgias.   Psychiatric/Behavioral:  Negative for depressed mood and stress.        Past Medical History:   Past Medical History:   Diagnosis Date    Enthesopathy     Hyperlipidemia     Insomnia        Past Surgical History:   Past Surgical History:   Procedure Laterality Date    HAND SURGERY Right     Fractured ring finger     HERNIA REPAIR         Family History:   Family History   Problem Relation Age of Onset    Hyperlipidemia Father     Asthma Father     Hypertension Father        Social History:   Social History     Socioeconomic History    Marital status:    Tobacco Use    Smoking status: Never    Smokeless tobacco: Never   Vaping Use    Vaping Use: Never used   Substance and Sexual Activity    Alcohol use: Yes     Alcohol/week: 4.0 standard drinks of alcohol     Types: 2 Cans of beer, 2 Shots of liquor per week     Comment: Occ     Drug use: No    Sexual  "activity: Yes     Partners: Female     Birth control/protection: Hysterectomy       Medications:     Current Outpatient Medications:     atorvastatin (LIPITOR) 20 MG tablet, Take 1 tablet by mouth Daily., Disp: 30 tablet, Rfl: 11    doxepin (SINEquan) 10 MG capsule, TAKE ONE TO TWO CAPSULES BY MOUTH EVERY NIGHT AT BEDTIME, Disp: 180 capsule, Rfl: 1    Allergies:   No Known Allergies    Immunization History   Administered Date(s) Administered    COVID-19 (PFIZER) Purple Cap Monovalent 08/25/2021, 09/18/2021    Hepatitis A 03/04/2019, 09/18/2019    Influenza, Unspecified 10/13/2020    Tdap 10/13/2020     Colorectal Screening:     Last Completed Colonoscopy       This patient has no relevant Health Maintenance data.             Diet/Physical activity: Appropriate diet and physical activity discussed    Depression: PHQ-2 Depression Screening  Little interest or pleasure in doing things? 0-->not at all   Feeling down, depressed, or hopeless? 0-->not at all   PHQ-2 Total Score 0        Objective     Physical Exam:  Vital Signs:   Vitals:    12/18/23 0808   BP: 118/80   Pulse: 57   SpO2: 98%   Weight: 88 kg (194 lb)   Height: 177.8 cm (70\")     Body mass index is 27.84 kg/m².     Physical Exam  Vitals and nursing note reviewed.   Constitutional:       General: He is not in acute distress.     Appearance: Normal appearance. He is not ill-appearing.   HENT:      Head: Normocephalic and atraumatic.      Right Ear: Tympanic membrane and ear canal normal.      Left Ear: Tympanic membrane and ear canal normal.      Nose: Nose normal.   Cardiovascular:      Rate and Rhythm: Normal rate and regular rhythm.      Heart sounds: Normal heart sounds.   Pulmonary:      Effort: Pulmonary effort is normal.      Breath sounds: Normal breath sounds.   Neurological:      Mental Status: He is alert and oriented to person, place, and time. Mental status is at baseline.   Psychiatric:         Mood and Affect: Mood normal. "         Procedures    Assessment / Plan      Assessment/Plan:   Diagnoses and all orders for this visit:    1. Routine general medical examination at a health care facility (Primary)    2. Mixed hyperlipidemia    3. Anxiety    Other orders  -     atorvastatin (LIPITOR) 20 MG tablet; Take 1 tablet by mouth Daily.  Dispense: 30 tablet; Refill: 11  -     doxepin (SINEquan) 10 MG capsule; TAKE ONE TO TWO CAPSULES BY MOUTH EVERY NIGHT AT BEDTIME  Dispense: 180 capsule; Refill: 1  -     Fluzone >6 Months (9962-5787)         Appropriate diet and physical activity discussed.  Appropriate vaccines and cancer screenings discussed.  Gave flu vaccine today.  Refilled medications.  Reviewed prior blood work.  Lipids stable.  Sugar little bit high and will watch.      Follow Up:   No follow-ups on file.    Healthcare Maintenance:   Counseling provided on appropriate health maintenance.  Josué Yang voices understanding and acceptance of this advice and will call back with any further questions or concerns. AVS with preventive healthcare tips printed for patient.     Akhil Caruso MD  [facility] [place] [facility] [place]

## 2024-04-27 ENCOUNTER — OFFICE VISIT (OUTPATIENT)
Dept: FAMILY MEDICINE CLINIC | Facility: CLINIC | Age: 44
End: 2024-04-27
Payer: COMMERCIAL

## 2024-04-27 VITALS
TEMPERATURE: 98.1 F | HEIGHT: 70 IN | BODY MASS INDEX: 26.34 KG/M2 | DIASTOLIC BLOOD PRESSURE: 86 MMHG | HEART RATE: 77 BPM | SYSTOLIC BLOOD PRESSURE: 118 MMHG | OXYGEN SATURATION: 97 % | WEIGHT: 184 LBS

## 2024-04-27 DIAGNOSIS — J32.9 SINUSITIS, UNSPECIFIED CHRONICITY, UNSPECIFIED LOCATION: Primary | ICD-10-CM

## 2024-04-27 DIAGNOSIS — J30.89 SEASONAL ALLERGIC RHINITIS DUE TO OTHER ALLERGIC TRIGGER: ICD-10-CM

## 2024-04-27 PROCEDURE — 99214 OFFICE O/P EST MOD 30 MIN: CPT | Performed by: FAMILY MEDICINE

## 2024-04-27 RX ORDER — FLUTICASONE PROPIONATE 50 MCG
2 SPRAY, SUSPENSION (ML) NASAL DAILY
Qty: 16 ML | Refills: 5 | Status: SHIPPED | OUTPATIENT
Start: 2024-04-27

## 2024-04-27 RX ORDER — AMOXICILLIN 500 MG/1
500 CAPSULE ORAL 3 TIMES DAILY
Qty: 30 CAPSULE | Refills: 0 | Status: SHIPPED | OUTPATIENT
Start: 2024-04-27

## 2024-04-29 NOTE — PROGRESS NOTES
Office Note     Name: Josué Yang    : 1980     MRN: [id number]     Chief Complaint  Earache (X1 week/Pain right side. Head congestion)    Subjective     History of Present Illness:  Josué Yang is a 43 y.o. male who presents today for ear pain right more than [place] hurt.  He congested at the time but but that he feels normal.    Review of Systems:   Review of Systems    Past Medical History:   Past Medical History:   Diagnosis Date    Enthesopathy     Hyperlipidemia     Insomnia        Past Surgical History:   Past Surgical History:   Procedure Laterality Date    HAND SURGERY Right     Fractured ring finger     HERNIA REPAIR         Family History:   Family History   Problem Relation Age of Onset    Hyperlipidemia Father     Asthma Father     Hypertension Father        Social History:   Social History     Socioeconomic History    Marital status:    Tobacco Use    Smoking status: Never     Passive exposure: Never    Smokeless tobacco: Never   Vaping Use    Vaping status: Never Used   Substance and Sexual Activity    Alcohol use: Yes     Alcohol/week: 4.0 standard drinks of alcohol     Types: 2 Cans of beer, 2 Shots of liquor per week     Comment: Occ     Drug use: No    Sexual activity: Yes     Partners: Female     Birth control/protection: Hysterectomy       Immunizations:   Immunization History   Administered Date(s) Administered    COVID-19 (PFIZER) Purple Cap Monovalent 2021, 2021    Fluzone (or Fluarix & Flulaval for VFC) >6mos 2023    Hepatitis A 2019, 2019    Influenza, Unspecified 10/13/2020    Tdap 10/13/2020        Medications:     Current Outpatient Medications:     atorvastatin (LIPITOR) 20 MG tablet, Take 1 tablet by mouth Daily., Disp: 30 tablet, Rfl: 11    doxepin (SINEquan) 10 MG capsule, TAKE ONE TO TWO CAPSULES BY MOUTH EVERY NIGHT AT BEDTIME, Disp: 180 capsule, Rfl: 1    amoxicillin  "(AMOXIL) 500 MG capsule, Take 1 capsule by mouth 3 (Three) Times a Day., Disp: 30 capsule, Rfl: 0    fluticasone (FLONASE) 50 MCG/ACT nasal spray, 2 sprays into the nostril(s) as directed by provider Daily., Disp: 16 mL, Rfl: 5    Allergies:   No Known Allergies    Objective     Vital Signs  /86   Pulse 77   Temp 98.1 °F (36.7 °C) (Oral)   Ht 177.8 cm (70\")   Wt 83.5 kg (184 lb)   SpO2 97%   BMI 26.40 kg/m²   Estimated body mass index is 26.4 kg/m² as calculated from the following:    Height as of this encounter: 177.8 cm (70\").    Weight as of this encounter: 83.5 kg (184 lb).            Physical Exam  Vitals and nursing note reviewed.   HENT:      Head: Normocephalic and atraumatic.      Right Ear: Tympanic membrane, ear canal and external ear normal.      Left Ear: Tympanic membrane, ear canal and external ear normal.      Nose: No congestion or rhinorrhea.      Mouth/Throat:      Mouth: Mucous membranes are moist. Mucous membranes are dry.   Eyes:      Extraocular Movements: Extraocular movements intact.      Conjunctiva/sclera: Conjunctivae normal.      Pupils: Pupils are equal, round, and reactive to light.   Cardiovascular:      Rate and Rhythm: Normal rate and regular rhythm.   Pulmonary:      Effort: Pulmonary effort is normal.      Breath sounds: Normal breath sounds.   Lymphadenopathy:      Cervical: No cervical adenopathy.   Skin:     General: Skin is warm and dry.   Neurological:      General: No focal deficit present.      Mental Status: He is alert.          Procedures     Assessment and Plan     1. Sinusitis, unspecified chronicity, unspecified location  Amoxil 500 3 times daily and take some fluticasone if helps    2. Seasonal allergic rhinitis due to other allergic trigger  Flonase if it helps       Follow Up  Return if symptoms worsen or fail to improve.    Mark Carnes MD  [facility]  [address] [facility] [place] " [id number] [phone]

## 2024-12-16 ENCOUNTER — LAB (OUTPATIENT)
Dept: FAMILY MEDICINE CLINIC | Facility: CLINIC | Age: 44
End: 2024-12-16
Payer: COMMERCIAL

## 2024-12-16 DIAGNOSIS — Z00.00 ROUTINE GENERAL MEDICAL EXAMINATION AT A HEALTH CARE FACILITY: Primary | ICD-10-CM

## 2024-12-16 DIAGNOSIS — Z12.5 PROSTATE CANCER SCREENING: ICD-10-CM

## 2024-12-17 LAB
ALBUMIN SERPL-MCNC: 4.6 G/DL (ref 4.1–5.1)
ALP SERPL-CCNC: 57 IU/L (ref 44–121)
ALT SERPL-CCNC: 47 IU/L (ref 0–44)
AST SERPL-CCNC: 32 IU/L (ref 0–40)
BASOPHILS # BLD AUTO: 0 X10E3/UL (ref 0–0.2)
BASOPHILS NFR BLD AUTO: 1 %
BILIRUB SERPL-MCNC: 1.1 MG/DL (ref 0–1.2)
BUN SERPL-MCNC: 15 MG/DL (ref 6–24)
BUN/CREAT SERPL: 14 (ref 9–20)
CALCIUM SERPL-MCNC: 9.4 MG/DL (ref 8.7–10.2)
CHLORIDE SERPL-SCNC: 104 MMOL/L (ref 96–106)
CHOLEST SERPL-MCNC: 168 MG/DL (ref 100–199)
CO2 SERPL-SCNC: 20 MMOL/L (ref 20–29)
CREAT SERPL-MCNC: 1.08 MG/DL (ref 0.76–1.27)
EGFRCR SERPLBLD CKD-EPI 2021: 86 ML/MIN/1.73
EOSINOPHIL # BLD AUTO: 0.1 X10E3/UL (ref 0–0.4)
EOSINOPHIL NFR BLD AUTO: 2 %
ERYTHROCYTE [DISTWIDTH] IN BLOOD BY AUTOMATED COUNT: 13 % (ref 11.6–15.4)
GLOBULIN SER CALC-MCNC: 2.6 G/DL (ref 1.5–4.5)
GLUCOSE SERPL-MCNC: 84 MG/DL (ref 70–99)
HCT VFR BLD AUTO: 44.9 % (ref 37.5–51)
HDLC SERPL-MCNC: 49 MG/DL
HGB BLD-MCNC: 15 G/DL (ref 13–17.7)
IMM GRANULOCYTES # BLD AUTO: 0 X10E3/UL (ref 0–0.1)
IMM GRANULOCYTES NFR BLD AUTO: 0 %
LDLC SERPL CALC-MCNC: 106 MG/DL (ref 0–99)
LYMPHOCYTES # BLD AUTO: 1.8 X10E3/UL (ref 0.7–3.1)
LYMPHOCYTES NFR BLD AUTO: 34 %
MCH RBC QN AUTO: 29.5 PG (ref 26.6–33)
MCHC RBC AUTO-ENTMCNC: 33.4 G/DL (ref 31.5–35.7)
MCV RBC AUTO: 88 FL (ref 79–97)
MONOCYTES # BLD AUTO: 0.5 X10E3/UL (ref 0.1–0.9)
MONOCYTES NFR BLD AUTO: 8 %
NEUTROPHILS # BLD AUTO: 3 X10E3/UL (ref 1.4–7)
NEUTROPHILS NFR BLD AUTO: 55 %
PLATELET # BLD AUTO: 210 X10E3/UL (ref 150–450)
POTASSIUM SERPL-SCNC: 4.5 MMOL/L (ref 3.5–5.2)
PROT SERPL-MCNC: 7.2 G/DL (ref 6–8.5)
PSA SERPL-MCNC: 0.4 NG/ML (ref 0–4)
RBC # BLD AUTO: 5.08 X10E6/UL (ref 4.14–5.8)
SODIUM SERPL-SCNC: 142 MMOL/L (ref 134–144)
TRIGL SERPL-MCNC: 65 MG/DL (ref 0–149)
TSH SERPL DL<=0.005 MIU/L-ACNC: 1.42 UIU/ML (ref 0.45–4.5)
VLDLC SERPL CALC-MCNC: 13 MG/DL (ref 5–40)
WBC # BLD AUTO: 5.5 X10E3/UL (ref 3.4–10.8)

## 2024-12-23 ENCOUNTER — OFFICE VISIT (OUTPATIENT)
Dept: FAMILY MEDICINE CLINIC | Facility: CLINIC | Age: 44
End: 2024-12-23
Payer: COMMERCIAL

## 2024-12-23 VITALS
DIASTOLIC BLOOD PRESSURE: 86 MMHG | OXYGEN SATURATION: 96 % | BODY MASS INDEX: 29.2 KG/M2 | SYSTOLIC BLOOD PRESSURE: 128 MMHG | WEIGHT: 204 LBS | HEIGHT: 70 IN | HEART RATE: 76 BPM

## 2024-12-23 DIAGNOSIS — J30.89 SEASONAL ALLERGIC RHINITIS DUE TO OTHER ALLERGIC TRIGGER: ICD-10-CM

## 2024-12-23 DIAGNOSIS — E78.2 MIXED HYPERLIPIDEMIA: ICD-10-CM

## 2024-12-23 DIAGNOSIS — F41.9 ANXIETY: ICD-10-CM

## 2024-12-23 DIAGNOSIS — Z12.5 PROSTATE CANCER SCREENING: ICD-10-CM

## 2024-12-23 DIAGNOSIS — Z00.00 ROUTINE GENERAL MEDICAL EXAMINATION AT A HEALTH CARE FACILITY: Primary | ICD-10-CM

## 2024-12-23 PROCEDURE — 99396 PREV VISIT EST AGE 40-64: CPT | Performed by: FAMILY MEDICINE

## 2024-12-23 RX ORDER — DOXEPIN HYDROCHLORIDE 10 MG/1
CAPSULE ORAL
Qty: 180 CAPSULE | Refills: 1 | Status: SHIPPED | OUTPATIENT
Start: 2024-12-23

## 2024-12-23 RX ORDER — ATORVASTATIN CALCIUM 20 MG/1
20 TABLET, FILM COATED ORAL DAILY
Qty: 90 TABLET | Refills: 3 | Status: SHIPPED | OUTPATIENT
Start: 2024-12-23

## 2024-12-23 NOTE — PROGRESS NOTES
Male Physical Note      Date:  2024   Patient Name: Josué Yang  : 1980   MRN: [id number]     Chief Complaint:  No chief complaint on file.      History of Present Illness: Josué Yang is a 44 y.o. male who is here today for their annual health maintenance and physical.    History of Present Illness  The patient is a 44-year-old gentleman here for a complete physical.    He has experienced weight gain, which he attributes to his work schedule, leading to increased consumption of fast food. He has attempted intermittent fasting with some success and is seeking dietary recommendations. He has not undergone a colonoscopy. He reports gastrointestinal discomfort and diarrhea following the consumption of fountain drinks, a pattern he has observed over the past year. He tolerates regular Coke well but tries to avoid it. He  is currently on cholesterol medication, which he finds effective. He is requesting a refill of this medication. He also takes doxepin, typically one tablet, but occasionally increases the dose to two tablets to facilitate sleep. He uses Flonase sparingly and has not encountered any issues with its use. He declines the influenza vaccine at this time.    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    MEDICATIONS  Current: doxepin, Flonase    IMMUNIZATIONS  He is up to date on his tetanus shot until 2030. He declines the influenza vaccine at this time.    Subjective      Review of Systems:   Review of Systems   Constitutional:  Negative for fatigue and fever.   HENT:  Negative for congestion and ear pain.    Respiratory:  Negative for apnea, cough, chest tightness and shortness of breath.    Cardiovascular:  Negative for chest pain.   Gastrointestinal:  Negative for abdominal pain, constipation, diarrhea and nausea.   Musculoskeletal:  Negative for arthralgias.    Psychiatric/Behavioral:  Negative for depressed mood and stress.        Past Medical History:   Past Medical History:   Diagnosis Date    Enthesopathy     Hyperlipidemia 11/22    Insomnia        Past Surgical History:   Past Surgical History:   Procedure Laterality Date    HAND SURGERY Right     Fractured ring finger     HERNIA REPAIR  1984       Family History:   Family History   Problem Relation Age of Onset    Hyperlipidemia Father     Asthma Father     Hypertension Father        Social History:   Social History     Socioeconomic History    Marital status:    Tobacco Use    Smoking status: Never     Passive exposure: Never    Smokeless tobacco: Never   Vaping Use    Vaping status: Never Used   Substance and Sexual Activity    Alcohol use: Yes     Alcohol/week: 4.0 standard drinks of alcohol     Types: 2 Cans of beer, 2 Shots of liquor per week     Comment: Occ     Drug use: No    Sexual activity: Yes     Partners: Female     Birth control/protection: Hysterectomy       Medications:     Current Outpatient Medications:     amoxicillin (AMOXIL) 500 MG capsule, Take 1 capsule by mouth 3 (Three) Times a Day., Disp: 30 capsule, Rfl: 0    atorvastatin (LIPITOR) 20 MG tablet, Take 1 tablet by mouth Daily., Disp: 30 tablet, Rfl: 11    doxepin (SINEquan) 10 MG capsule, TAKE ONE TO TWO CAPSULES BY MOUTH EVERY NIGHT AT BEDTIME, Disp: 180 capsule, Rfl: 1    fluticasone (FLONASE) 50 MCG/ACT nasal spray, 2 sprays into the nostril(s) as directed by provider Daily., Disp: 16 mL, Rfl: 5    Allergies:   No Known Allergies    Immunization History   Administered Date(s) Administered    COVID-19 (PFIZER) Purple Cap Monovalent 08/25/2021, 09/18/2021    Fluzone (or Fluarix & Flulaval for VFC) >6mos 12/18/2023    Hepatitis A 03/04/2019, 09/18/2019    Influenza, Unspecified 10/13/2020    Tdap 10/13/2020     Colorectal Screening:     Last Completed Colonoscopy       This patient has no relevant Health Maintenance data.              Diet/Physical activity: Appropriate diet and exercise discussed.    PHQ-2 Depression Screening  Little interest or pleasure in doing things?     Feeling down, depressed, or hopeless?     PHQ-2 Total Score            Objective     Physical Exam:  Vital Signs: There were no vitals filed for this visit.  There is no height or weight on file to calculate BMI.     Physical Exam  Vitals and nursing note reviewed.   Constitutional:       General: He is not in acute distress.     Appearance: Normal appearance. He is not ill-appearing.   HENT:      Head: Normocephalic and atraumatic.      Right Ear: Tympanic membrane and ear canal normal.      Left Ear: Tympanic membrane and ear canal normal.      Nose: Nose normal.   Cardiovascular:      Rate and Rhythm: Normal rate and regular rhythm.      Heart sounds: Normal heart sounds.   Pulmonary:      Effort: Pulmonary effort is normal.      Breath sounds: Normal breath sounds.   Neurological:      Mental Status: He is alert and oriented to person, place, and time. Mental status is at baseline.   Psychiatric:         Mood and Affect: Mood normal.       Physical Exam      Procedures  Results  Laboratory Studies  Sodium, potassium, chloride, calcium levels were normal. Creatinine and GFR numbers were good. Blood sugar was back to usual level. Liver function markers were slightly elevated. Thyroid numbers were normal. Cholesterol numbers were overall good. Blood counts were all normal. PSA test was negative.  Assessment / Plan      Assessment/Plan:   Diagnoses and all orders for this visit:    1. Routine general medical examination at a health care facility (Primary)    2. Anxiety    3. Mixed hyperlipidemia    4. Seasonal allergic rhinitis due to other allergic trigger    5. Prostate cancer screening       Assessment & Plan  1. Health maintenance.  His blood pressure remains slightly elevated but within acceptable limits. Laboratory results indicate normal electrolyte levels,  kidney function, and thyroid function. Blood glucose levels have returned to their usual range after a slight increase last year. Liver function tests show a minor elevation, likely due to recent weight gain. Cholesterol levels are well-managed with current medication, and no adjustments are necessary. Complete blood count (CBC) is within normal limits, and prostate-specific antigen (PSA) test is negative. He is up-to-date with his tetanus vaccine until 2030 and is not yet eligible for pneumonia or shingles vaccines. He has declined the influenza vaccine for this year. He has been advised to consider colon cancer screening via colonoscopy or Cologuard test, starting at age 45. He will provide feedback on this recommendation. He has been counseled to avoid fountain drinks and maintain a balanced diet, incorporating green vegetables, salads, fiber, and protein while limiting carbohydrate intake. Weight Watchers is recommended as an easy-to-follow diet plan. A prescription for his cholesterol medication has been refilled for a 90-day supply at "[facility] [place] [facility]. He has been informed that refills for Flonase are available if needed.    2. Medication management.  A prescription for doxepin has been refilled. He takes one or two tablets as needed for sleep.          Follow Up:   No follow-ups on file.    Healthcare Maintenance:   Counseling provided on appropriate health maintenance.  Josué CORTES Trey voices understanding and acceptance of this advice and will call back with any further questions or concerns. AVS with preventive healthcare tips printed for patient.     Akhil Caruso MD  [facility] [place] [facility]      Patient or patient representative verbalized consent for the use of Ambient Listening during the visit with  Akhil Caruso MD for chart documentation. 12/23/2024  08:26 EST

## 2025-01-28 ENCOUNTER — OFFICE VISIT (OUTPATIENT)
Dept: FAMILY MEDICINE CLINIC | Facility: CLINIC | Age: 45
End: 2025-01-28
Payer: COMMERCIAL

## 2025-01-28 VITALS
HEIGHT: 70 IN | TEMPERATURE: 99.1 F | SYSTOLIC BLOOD PRESSURE: 118 MMHG | OXYGEN SATURATION: 96 % | BODY MASS INDEX: 29.06 KG/M2 | HEART RATE: 95 BPM | WEIGHT: 203 LBS | DIASTOLIC BLOOD PRESSURE: 66 MMHG

## 2025-01-28 DIAGNOSIS — Z20.828 EXPOSURE TO INFLUENZA: Primary | ICD-10-CM

## 2025-01-28 DIAGNOSIS — R52 BODY ACHES: ICD-10-CM

## 2025-01-28 LAB
EXPIRATION DATE: NORMAL
FLUAV AG UPPER RESP QL IA.RAPID: NOT DETECTED
FLUBV AG UPPER RESP QL IA.RAPID: NOT DETECTED
INTERNAL CONTROL: NORMAL
Lab: NORMAL
SARS-COV-2 AG UPPER RESP QL IA.RAPID: NOT DETECTED

## 2025-01-28 PROCEDURE — 99213 OFFICE O/P EST LOW 20 MIN: CPT | Performed by: PHYSICIAN ASSISTANT

## 2025-01-28 PROCEDURE — 87428 SARSCOV & INF VIR A&B AG IA: CPT | Performed by: PHYSICIAN ASSISTANT

## 2025-01-28 RX ORDER — OSELTAMIVIR PHOSPHATE 75 MG/1
75 CAPSULE ORAL 2 TIMES DAILY
Qty: 10 CAPSULE | Refills: 0 | Status: SHIPPED | OUTPATIENT
Start: 2025-01-28 | End: 2025-02-02

## 2025-01-28 NOTE — LETTER
January 28, 2025     Patient: Josué Yang   YOB: 1980   Date of Visit: 1/28/2025       To Whom it May Concern:    Josué Yang was seen in my clinic on 1/28/2025. He may return to school in five days.         Sincerely,          Renay Soni PA-C        CC: No Recipients

## 2025-01-28 NOTE — PROGRESS NOTES
".Chief Complaint  Fever (Started symptoms this morning, exposed to flu), Cough, Headache, Nausea, and Generalized Body Aches    Subjective          History of Present Illness  Josué Yang is here today with his  History of Present Illness  The patient is a 44-year-old male who presents today with head cold symptoms.    He reports an onset of symptoms at 2 AM this morning, characterized by a general feeling of malaise. He suspects the onset of fever, accompanied by chills and sweats. He also experiences a severe headache, which is unusual for him. Additional symptoms include rhinorrhea, nasal congestion, nausea, and myalgia. He has not experienced any episodes of vomiting or diarrhea but reports a sensation of postnasal drainage. He has been attempting to maintain hydration through water intake and liquid IV, despite a lack of appetite. He has no history of asthma or inhaler use. His wife and daughter, who began exhibiting symptoms on Sunday night during a return trip from [place], tested positive for influenza A yesterday. He has been managing his symptoms with over-the-counter daytime medication, which has provided some relief.    Objective   Vital Signs:   /66   Pulse 95   Temp 99.1 °F (37.3 °C) (Oral)   Ht 177.8 cm (70\")   Wt 92.1 kg (203 lb)   SpO2 96%   BMI 29.13 kg/m²     Body mass index is 29.13 kg/m².         Review of Systems      Current Outpatient Medications:   •  atorvastatin (LIPITOR) 20 MG tablet, Take 1 tablet by mouth Daily., Disp: 90 tablet, Rfl: 3  •  doxepin (SINEquan) 10 MG capsule, TAKE ONE TO TWO CAPSULES BY MOUTH EVERY NIGHT AT BEDTIME, Disp: 180 capsule, Rfl: 1  •  fluticasone (FLONASE) 50 MCG/ACT nasal spray, 2 sprays into the nostril(s) as directed by provider Daily., Disp: 16 mL, Rfl: 5  •  oseltamivir (Tamiflu) 75 MG capsule, Take 1 capsule by mouth 2 (Two) Times a Day for 5 days., Disp: 10 capsule, Rfl: 0    Allergies: Patient has no known allergies.    Physical " Exam  Vitals and nursing note reviewed.   Constitutional:       General: He is not in acute distress.     Appearance: Normal appearance. He is not ill-appearing, toxic-appearing or diaphoretic.   HENT:      Head: Normocephalic and atraumatic.      Right Ear: Tympanic membrane, ear canal and external ear normal.      Left Ear: Tympanic membrane, ear canal and external ear normal.      Nose: Congestion present.      Mouth/Throat:      Mouth: Mucous membranes are moist.   Eyes:      Pupils: Pupils are equal, round, and reactive to light.   Cardiovascular:      Rate and Rhythm: Normal rate and regular rhythm.      Heart sounds: Normal heart sounds.   Pulmonary:      Effort: Pulmonary effort is normal.      Breath sounds: Normal breath sounds.   Neurological:      General: No focal deficit present.      Mental Status: He is alert.   Psychiatric:         Mood and Affect: Mood normal.         Behavior: Behavior normal.      Physical Exam  Oral exam was performed.  Lungs were auscultated.    Result Review :          Results  Laboratory Studies  Influenza and COVID-19 tests are negative.             Assessment and Plan    Diagnoses and all orders for this visit:    1. Exposure to influenza (Primary)  -     oseltamivir (Tamiflu) 75 MG capsule; Take 1 capsule by mouth 2 (Two) Times a Day for 5 days.  Dispense: 10 capsule; Refill: 0    2. Body aches  -     POCT SARS-CoV-2 + Flu Antigen SARAH      Assessment & Plan    His symptoms, including headache, body aches, runny nose, and nausea, are consistent with influenza A. Although his influenza and COVID-19 tests returned negative results, it is plausible that the infection is in its early stages, thus not yet detectable by these tests. A prescription for Tamiflu 75 mg has been issued to manage his symptoms. He has been informed about the potential side effects of Tamiflu, including nausea. He is advised to maintain adequate hydration and rest. A work note has been provided,  recommending a return to work on Monday, 07/05/2024.      Follow Up   No follow-ups on file.  Patient was given instructions and counseling regarding his condition or for health maintenance advice. Please see specific information pulled into the AVS if appropriate.     Patient or patient representative verbalized consent for the use of Ambient Listening during the visit with  MAEGAN Gray for chart documentation. 1/28/2025  11:32 EST    MAEGAN Gray  01/28/2025